# Patient Record
(demographics unavailable — no encounter records)

---

## 2025-03-21 NOTE — HISTORY OF PRESENT ILLNESS
[de-identified] : Patient is a 50 year old M with a PMHx of HTN coming in for annual physical.  Patient inquires if he still needs to take his blood pressure medications.  Patient reports that he stopped taking his BP medications about 3 months ago.  Patient reports his BPs are around 140s to 130s systolic and 90s to 80s diastolic.  Patient reports he is more often in the 140s.  Patient reports he walks over 6 miles a day and is working to improve diet.

## 2025-03-21 NOTE — HEALTH RISK ASSESSMENT
[1 or 2 (0 pts)] : 1 or 2 (0 points) [Never (0 pts)] : Never (0 points) [No] : In the past 12 months have you used drugs other than those required for medical reasons? No [0] : 2) Feeling down, depressed, or hopeless: Not at all (0) [PHQ-2 Negative - No further assessment needed] : PHQ-2 Negative - No further assessment needed [Never] : Never [0-4] : 0-4 [Audit-CScore] : 0 [[id number]] : 0

## 2025-07-21 NOTE — REASON FOR VISIT
[Symptom and Test Evaluation] : symptom and test evaluation [Hypertension] : hypertension [FreeTextEntry1] : PCP/Referring Doctor: Dr Otilio Morris Chief Complaint: "self-referred wants to check his heart " ------------------------------------------------------------------------------------------------------------------------------------ History of Present Illness:  HTN- started on valsartan  HLD- LDL 150s Palpitations: occasional palpitations.  no prior cardiac testing.   ROS:  chest pain (-), dyspnea with exertion (-), orthopnea (-), edema (-), palpitations (+), dizziness (-) All other systems were reviewed and are negative. ------------------------------------------------------------------------------------------------------------------------------------ Past Medical History: Any history of HTN? Yes  Any history of Lipid disorders? No TG 132mg/dl  LDL 156mg/dl  Any history Diabetes or Prediabetes? No HbA1c 5.4% Any history of MI, stroke or TIA? No  Any prior Stress Testing? No  Any prior Cath/Angiogram procedure? No  Any prior Echocardiogram (TTE)? No  Any prior vascular study? No  Have patient been on blood thinners? No  Have patient had cardiac or vascular surgeries? No  ------------------------------------------------------------------------------------------------------------------------------------ Patient's current cardiovascular medications were reviewed and updated in chart. Yes  ------------------------------------------------------------------------------------------------------------------------------------ Family history Do you have a family history of heart attacks and/or stroke before the age of 60? no Do you have a family history of cardiac arrest? no ------------------------------------------------------------------------------------------------------------------------------------ Social History: Do you currently or previously used tobacco including cigarettes and vapes? no How many alcoholic drinks per week? 0-3, 3-7, >8 0-3 What is your occupation?  ------------------------------------------------------------------------------------------------------------------------------------ Physical Exam: General appearance: NAD Lungs: CTAB CV: RRR Extremities: No peripheral edema.

## 2025-07-21 NOTE — ASSESSMENT
[FreeTextEntry1] : Problems Assessed: 1. HTN 2. HLD     Plan: - Echocardiogram for assessment of hypertensive heart disease.  - CAC for further risk stratification  - Dietary and lifestyle changes discussed.   Follow-up: 1 month  ----------------------------------------------------------------------------------------- Billing Statement: ECG obtained in the diagnosis and treatment of assessed problems.  Total time spent on this encounter was 45 minutes. This includes non-face-to-face time before the visit when I reviewed relevant portions of the patient's medical record and time spent on documentation after the visit. During face-to-face time, I took a relevant history, examined the patient and explained differential diagnoses, relevant cardiac diagnoses, workup, and management plan.